# Patient Record
Sex: MALE | Race: WHITE | NOT HISPANIC OR LATINO | Employment: FULL TIME | URBAN - METROPOLITAN AREA
[De-identification: names, ages, dates, MRNs, and addresses within clinical notes are randomized per-mention and may not be internally consistent; named-entity substitution may affect disease eponyms.]

---

## 2021-08-24 ENCOUNTER — OFFICE VISIT (OUTPATIENT)
Dept: VASCULAR SURGERY | Facility: HOSPITAL | Age: 71
End: 2021-08-24

## 2021-08-24 VITALS
SYSTOLIC BLOOD PRESSURE: 138 MMHG | DIASTOLIC BLOOD PRESSURE: 80 MMHG | TEMPERATURE: 97.3 F | OXYGEN SATURATION: 97 % | RESPIRATION RATE: 16 BRPM | HEART RATE: 80 BPM

## 2021-08-24 DIAGNOSIS — M79.671 FOOT PAIN, RIGHT: Primary | ICD-10-CM

## 2021-08-24 PROCEDURE — 99203 OFFICE O/P NEW LOW 30 MIN: CPT | Performed by: SURGERY

## 2021-08-24 PROCEDURE — G0463 HOSPITAL OUTPT CLINIC VISIT: HCPCS | Performed by: SURGERY

## 2021-08-24 PROCEDURE — G0463 HOSPITAL OUTPT CLINIC VISIT: HCPCS

## 2021-08-24 RX ORDER — TRIAMCINOLONE ACETONIDE 1 MG/G
CREAM TOPICAL 2 TIMES DAILY
COMMUNITY

## 2021-08-24 RX ORDER — ALBUTEROL SULFATE 90 UG/1
2 AEROSOL, METERED RESPIRATORY (INHALATION) EVERY 4 HOURS PRN
COMMUNITY

## 2021-08-24 RX ORDER — GABAPENTIN 100 MG/1
100 CAPSULE ORAL 2 TIMES DAILY
COMMUNITY

## 2021-08-24 RX ORDER — IPRATROPIUM BROMIDE AND ALBUTEROL SULFATE 2.5; .5 MG/3ML; MG/3ML
3 SOLUTION RESPIRATORY (INHALATION) EVERY 4 HOURS PRN
COMMUNITY

## 2021-08-24 NOTE — PROGRESS NOTES
[facility]   HISTORY AND PHYSICAL    Patient Name: Ivan Mcarthur  : 1950  MRN: [id number]  Primary Care Physician:  Sara Batista      Subjective   Subjective     Chief Complaint: Abnormal arterial Doppler    HPI:    Ivan Mcarthur is a 71 y.o. male who presents with an abnormal arterial Dopplers.  He is sent by his primary care and podiatrist.  The patient complains of burning sensation in the right foot at times.  He states that several weeks ago the right foot turned purple but has now resolved.  He denies any claudication or rest pain.  No open wounds on his feet.        Personal History     Past Medical History:   Diagnosis Date   • COPD (chronic obstructive pulmonary disease) (CMS/[facility])        History reviewed. No pertinent surgical history.    Family History: family history includes Diabetes in his brother and father. Otherwise pertinent FHx was reviewed and not pertinent to current issue.    Social History:  reports that he quit smoking 10 days ago. He has a 53.00 pack-year smoking history. He does not have any smokeless tobacco history on file. He reports that he does not drink alcohol.    Home Medications:  Current Outpatient Medications on File Prior to Visit   Medication Sig   • albuterol sulfate  (90 Base) MCG/ACT inhaler Inhale 2 puffs Every 4 (Four) Hours As Needed for Shortness of Air.   • gabapentin (NEURONTIN) 100 MG capsule Take 100 mg by mouth 2 (two) times a day. Take one tab by mouth twice daily.   • ipratropium-albuterol (DUO-NEB) 0.5-2.5 mg/3 ml nebulizer Take 3 mL by nebulization Every 4 (Four) Hours As Needed for Shortness of Air.   • triamcinolone (KENALOG) 0.1 % cream Apply  topically to the appropriate area as directed 2 (Two) Times a Day.     No current facility-administered medications on file prior to visit.          Allergies:  No Known Allergies    Objective   Objective     Vitals:   Temp:  [97.3 °F (36.3 °C)] 97.3 °F (36.3 °C)  Heart Rate:  [80] 80  Resp:  [16]  16  BP: (138)/(80) 138/80    Physical Exam  Constitutional:       Appearance: Normal appearance.   HENT:      Head: Normocephalic and atraumatic.   Eyes:      Extraocular Movements: Extraocular movements intact.      Pupils: Pupils are equal, round, and reactive to light.   Cardiovascular:      Rate and Rhythm: Normal rate and regular rhythm.      Pulses:           Carotid pulses are 2+ on the right side and 2+ on the left side.       Radial pulses are 2+ on the right side and 2+ on the left side.        Femoral pulses are 2+ on the right side and 2+ on the left side.       Popliteal pulses are 2+ on the right side and 2+ on the left side.        Dorsalis pedis pulses are 2+ on the right side and 2+ on the left side.        Posterior tibial pulses are 2+ on the right side and 2+ on the left side.   Pulmonary:      Effort: Pulmonary effort is normal.      Breath sounds: Normal breath sounds.   Abdominal:      General: Abdomen is flat. Bowel sounds are normal.      Palpations: Abdomen is soft.   Musculoskeletal:      Cervical back: Normal range of motion and neck supple.   Skin:     General: Skin is warm and dry.   Neurological:      General: No focal deficit present.      Mental Status: He is alert and oriented to person, place, and time.            Result Review    Most notable findings include: ABIs are greater than 1 bilaterally.  Toe pressures are reduced    Assessment/Plan   Assessment / Plan     Brief Patient Summary:  Ivan Mcarthur is a 71 y.o. male who no evidence of PVD with right foot pain    Diagnoses and all orders for this visit:    1. Foot pain, right (Primary)         Plan:   Arterial Dopplers and physical exam suggest no evidence of PVD.  He may have some mild microvascular disease which is causing the decrease in toe pressures.  The burning pain that he is having in the right foot could be due to other issues.  No vascular intervention needed at this time.  He may follow-up as needed.    Thank you for  allowing me to see your patient.        Electronically signed by Shahzad Lozano MD, MD, 08/24/21, 9:09 AM EDT.